# Patient Record
Sex: FEMALE | Race: WHITE | NOT HISPANIC OR LATINO
[De-identification: names, ages, dates, MRNs, and addresses within clinical notes are randomized per-mention and may not be internally consistent; named-entity substitution may affect disease eponyms.]

---

## 2017-03-09 ENCOUNTER — APPOINTMENT (OUTPATIENT)
Dept: OBGYN | Facility: CLINIC | Age: 72
End: 2017-03-09

## 2017-03-09 VITALS
DIASTOLIC BLOOD PRESSURE: 70 MMHG | RESPIRATION RATE: 16 BRPM | WEIGHT: 126 LBS | HEART RATE: 75 BPM | BODY MASS INDEX: 22.32 KG/M2 | SYSTOLIC BLOOD PRESSURE: 130 MMHG | OXYGEN SATURATION: 98 % | HEIGHT: 63 IN

## 2017-03-09 DIAGNOSIS — Z13.820 ENCOUNTER FOR SCREENING FOR OSTEOPOROSIS: ICD-10-CM

## 2017-03-09 DIAGNOSIS — Z12.31 ENCOUNTER FOR SCREENING MAMMOGRAM FOR MALIGNANT NEOPLASM OF BREAST: ICD-10-CM

## 2017-03-09 DIAGNOSIS — Z80.49 FAMILY HISTORY OF MALIGNANT NEOPLASM OF OTHER GENITAL ORGANS: ICD-10-CM

## 2017-03-13 LAB — HPV HIGH+LOW RISK DNA PNL CVX: NEGATIVE

## 2017-03-15 LAB — CYTOLOGY CVX/VAG DOC THIN PREP: NORMAL

## 2017-11-14 ENCOUNTER — APPOINTMENT (OUTPATIENT)
Dept: MAMMOGRAPHY | Facility: HOSPITAL | Age: 72
End: 2017-11-14
Payer: MEDICARE

## 2017-11-14 ENCOUNTER — OUTPATIENT (OUTPATIENT)
Dept: OUTPATIENT SERVICES | Facility: HOSPITAL | Age: 72
LOS: 1 days | End: 2017-11-14
Payer: COMMERCIAL

## 2017-11-14 DIAGNOSIS — Z00.8 ENCOUNTER FOR OTHER GENERAL EXAMINATION: ICD-10-CM

## 2017-11-14 DIAGNOSIS — Z12.31 ENCOUNTER FOR SCREENING MAMMOGRAM FOR MALIGNANT NEOPLASM OF BREAST: ICD-10-CM

## 2017-11-14 PROCEDURE — 77067 SCR MAMMO BI INCL CAD: CPT

## 2017-11-14 PROCEDURE — G0202: CPT | Mod: 26

## 2017-11-14 PROCEDURE — 77063 BREAST TOMOSYNTHESIS BI: CPT

## 2017-11-14 PROCEDURE — 77063 BREAST TOMOSYNTHESIS BI: CPT | Mod: 26

## 2018-02-02 ENCOUNTER — APPOINTMENT (OUTPATIENT)
Dept: CARDIOLOGY | Facility: CLINIC | Age: 73
End: 2018-02-02

## 2018-04-26 ENCOUNTER — OUTPATIENT (OUTPATIENT)
Dept: OUTPATIENT SERVICES | Facility: HOSPITAL | Age: 73
LOS: 1 days | End: 2018-04-26
Payer: COMMERCIAL

## 2018-04-26 ENCOUNTER — APPOINTMENT (OUTPATIENT)
Dept: RADIOLOGY | Facility: HOSPITAL | Age: 73
End: 2018-04-26
Payer: MEDICARE

## 2018-04-26 DIAGNOSIS — Z00.8 ENCOUNTER FOR OTHER GENERAL EXAMINATION: ICD-10-CM

## 2018-04-26 DIAGNOSIS — M81.0 AGE-RELATED OSTEOPOROSIS WITHOUT CURRENT PATHOLOGICAL FRACTURE: ICD-10-CM

## 2018-04-26 DIAGNOSIS — Z13.820 ENCOUNTER FOR SCREENING FOR OSTEOPOROSIS: ICD-10-CM

## 2018-04-26 DIAGNOSIS — M85.89 OTHER SPECIFIED DISORDERS OF BONE DENSITY AND STRUCTURE, MULTIPLE SITES: ICD-10-CM

## 2018-04-26 PROCEDURE — 77080 DXA BONE DENSITY AXIAL: CPT | Mod: 26

## 2018-04-26 PROCEDURE — 77080 DXA BONE DENSITY AXIAL: CPT

## 2018-06-04 ENCOUNTER — APPOINTMENT (OUTPATIENT)
Dept: ORTHOPEDIC SURGERY | Facility: CLINIC | Age: 73
End: 2018-06-04
Payer: MEDICARE

## 2018-06-04 VITALS
DIASTOLIC BLOOD PRESSURE: 70 MMHG | SYSTOLIC BLOOD PRESSURE: 154 MMHG | HEIGHT: 63 IN | HEART RATE: 75 BPM | BODY MASS INDEX: 22.32 KG/M2 | WEIGHT: 126 LBS

## 2018-06-04 DIAGNOSIS — M54.5 LOW BACK PAIN: ICD-10-CM

## 2018-06-04 PROCEDURE — 99204 OFFICE O/P NEW MOD 45 MIN: CPT

## 2018-07-06 ENCOUNTER — APPOINTMENT (OUTPATIENT)
Dept: MRI IMAGING | Facility: HOSPITAL | Age: 73
End: 2018-07-06
Payer: MEDICARE

## 2018-07-06 ENCOUNTER — OUTPATIENT (OUTPATIENT)
Dept: OUTPATIENT SERVICES | Facility: HOSPITAL | Age: 73
LOS: 1 days | End: 2018-07-06
Payer: COMMERCIAL

## 2018-07-06 DIAGNOSIS — Z00.8 ENCOUNTER FOR OTHER GENERAL EXAMINATION: ICD-10-CM

## 2018-07-06 PROCEDURE — 72148 MRI LUMBAR SPINE W/O DYE: CPT

## 2018-07-06 PROCEDURE — 72148 MRI LUMBAR SPINE W/O DYE: CPT | Mod: 26

## 2018-07-26 PROBLEM — Z12.31 BREAST CANCER SCREENING: Status: ACTIVE | Noted: 2017-03-09

## 2018-12-14 ENCOUNTER — APPOINTMENT (OUTPATIENT)
Dept: MAMMOGRAPHY | Facility: HOSPITAL | Age: 73
End: 2018-12-14
Payer: MEDICARE

## 2018-12-14 ENCOUNTER — OUTPATIENT (OUTPATIENT)
Dept: OUTPATIENT SERVICES | Facility: HOSPITAL | Age: 73
LOS: 1 days | End: 2018-12-14
Payer: COMMERCIAL

## 2018-12-14 DIAGNOSIS — Z00.8 ENCOUNTER FOR OTHER GENERAL EXAMINATION: ICD-10-CM

## 2018-12-14 PROCEDURE — 77067 SCR MAMMO BI INCL CAD: CPT | Mod: 26

## 2018-12-14 PROCEDURE — 77067 SCR MAMMO BI INCL CAD: CPT

## 2018-12-14 PROCEDURE — 77063 BREAST TOMOSYNTHESIS BI: CPT

## 2018-12-14 PROCEDURE — 77063 BREAST TOMOSYNTHESIS BI: CPT | Mod: 26

## 2019-02-20 ENCOUNTER — NON-APPOINTMENT (OUTPATIENT)
Age: 74
End: 2019-02-20

## 2019-02-20 ENCOUNTER — APPOINTMENT (OUTPATIENT)
Dept: CARDIOLOGY | Facility: CLINIC | Age: 74
End: 2019-02-20
Payer: MEDICARE

## 2019-02-20 VITALS
DIASTOLIC BLOOD PRESSURE: 60 MMHG | RESPIRATION RATE: 16 BRPM | SYSTOLIC BLOOD PRESSURE: 144 MMHG | HEIGHT: 63 IN | HEART RATE: 79 BPM | BODY MASS INDEX: 22.32 KG/M2 | WEIGHT: 126 LBS | OXYGEN SATURATION: 99 %

## 2019-02-20 DIAGNOSIS — R07.9 CHEST PAIN, UNSPECIFIED: ICD-10-CM

## 2019-02-20 PROCEDURE — 99205 OFFICE O/P NEW HI 60 MIN: CPT

## 2019-02-20 PROCEDURE — 93000 ELECTROCARDIOGRAM COMPLETE: CPT

## 2019-02-22 ENCOUNTER — APPOINTMENT (OUTPATIENT)
Dept: CARDIOLOGY | Facility: CLINIC | Age: 74
End: 2019-02-22
Payer: MEDICARE

## 2019-02-22 PROCEDURE — 93306 TTE W/DOPPLER COMPLETE: CPT

## 2019-03-22 ENCOUNTER — APPOINTMENT (OUTPATIENT)
Dept: CARDIOLOGY | Facility: CLINIC | Age: 74
End: 2019-03-22
Payer: MEDICARE

## 2019-03-22 PROCEDURE — 93015 CV STRESS TEST SUPVJ I&R: CPT

## 2019-06-06 ENCOUNTER — APPOINTMENT (OUTPATIENT)
Dept: OBGYN | Facility: CLINIC | Age: 74
End: 2019-06-06
Payer: MEDICARE

## 2019-06-06 VITALS
WEIGHT: 122 LBS | HEIGHT: 63 IN | HEART RATE: 82 BPM | BODY MASS INDEX: 21.62 KG/M2 | SYSTOLIC BLOOD PRESSURE: 115 MMHG | DIASTOLIC BLOOD PRESSURE: 58 MMHG

## 2019-06-06 PROCEDURE — G0101: CPT

## 2019-06-06 RX ORDER — FAMOTIDINE 40 MG/1
40 TABLET, FILM COATED ORAL
Refills: 0 | Status: ACTIVE | COMMUNITY

## 2019-06-06 NOTE — CHIEF COMPLAINT
[Annual Visit] : annual visit [FreeTextEntry1] : pap 2017,mammo 2019,colon 2017,dexa 2018\par 1 daughter colostomy--per colon--divertic abscess\par the other appendectomy\par py crohn stable

## 2019-06-06 NOTE — PHYSICAL EXAM
[Awake] : awake [Alert] : alert [Soft] : soft [Oriented x3] : oriented to person, place, and time [Normal] : vagina [No Bleeding] : there was no active vaginal bleeding [Enlarged ___ wks] : enlarged [unfilled] ~Uweeks [Uterine Adnexae] : were not tender and not enlarged [Mass] : no breast mass [Acute Distress] : no acute distress [Nipple Discharge] : no nipple discharge [Axillary LAD] : no axillary lymphadenopathy [Tender] : non tender

## 2019-06-07 LAB — HPV HIGH+LOW RISK DNA PNL CVX: NOT DETECTED

## 2019-06-11 LAB — CYTOLOGY CVX/VAG DOC THIN PREP: NORMAL

## 2019-10-14 ENCOUNTER — OUTPATIENT (OUTPATIENT)
Dept: OUTPATIENT SERVICES | Facility: HOSPITAL | Age: 74
LOS: 1 days | End: 2019-10-14
Payer: COMMERCIAL

## 2019-10-14 ENCOUNTER — APPOINTMENT (OUTPATIENT)
Dept: CT IMAGING | Facility: HOSPITAL | Age: 74
End: 2019-10-14
Payer: MEDICARE

## 2019-10-14 DIAGNOSIS — Z00.8 ENCOUNTER FOR OTHER GENERAL EXAMINATION: ICD-10-CM

## 2019-10-14 PROCEDURE — 70450 CT HEAD/BRAIN W/O DYE: CPT | Mod: 26

## 2019-10-14 PROCEDURE — 70450 CT HEAD/BRAIN W/O DYE: CPT

## 2019-10-18 ENCOUNTER — APPOINTMENT (OUTPATIENT)
Dept: ULTRASOUND IMAGING | Facility: HOSPITAL | Age: 74
End: 2019-10-18
Payer: MEDICARE

## 2019-10-18 ENCOUNTER — OUTPATIENT (OUTPATIENT)
Dept: OUTPATIENT SERVICES | Facility: HOSPITAL | Age: 74
LOS: 1 days | End: 2019-10-18
Payer: COMMERCIAL

## 2019-10-18 DIAGNOSIS — Z00.8 ENCOUNTER FOR OTHER GENERAL EXAMINATION: ICD-10-CM

## 2019-10-18 PROCEDURE — 76700 US EXAM ABDOM COMPLETE: CPT | Mod: 26

## 2019-10-18 PROCEDURE — 76700 US EXAM ABDOM COMPLETE: CPT

## 2019-10-22 ENCOUNTER — OUTPATIENT (OUTPATIENT)
Dept: OUTPATIENT SERVICES | Facility: HOSPITAL | Age: 74
LOS: 1 days | End: 2019-10-22
Payer: COMMERCIAL

## 2019-10-22 ENCOUNTER — APPOINTMENT (OUTPATIENT)
Dept: ULTRASOUND IMAGING | Facility: HOSPITAL | Age: 74
End: 2019-10-22
Payer: MEDICARE

## 2019-10-22 DIAGNOSIS — Z00.8 ENCOUNTER FOR OTHER GENERAL EXAMINATION: ICD-10-CM

## 2019-10-22 PROCEDURE — 76856 US EXAM PELVIC COMPLETE: CPT

## 2019-10-22 PROCEDURE — 76856 US EXAM PELVIC COMPLETE: CPT | Mod: 26

## 2019-10-22 PROCEDURE — 76830 TRANSVAGINAL US NON-OB: CPT | Mod: 26

## 2019-10-22 PROCEDURE — 76830 TRANSVAGINAL US NON-OB: CPT

## 2020-11-17 ENCOUNTER — OUTPATIENT (OUTPATIENT)
Dept: OUTPATIENT SERVICES | Facility: HOSPITAL | Age: 75
LOS: 1 days | End: 2020-11-17
Payer: COMMERCIAL

## 2020-11-17 ENCOUNTER — APPOINTMENT (OUTPATIENT)
Dept: RADIOLOGY | Facility: HOSPITAL | Age: 75
End: 2020-11-17
Payer: MEDICARE

## 2020-11-17 DIAGNOSIS — Z00.8 ENCOUNTER FOR OTHER GENERAL EXAMINATION: ICD-10-CM

## 2020-11-17 PROCEDURE — 77080 DXA BONE DENSITY AXIAL: CPT | Mod: 26

## 2020-11-17 PROCEDURE — 77080 DXA BONE DENSITY AXIAL: CPT

## 2020-12-01 ENCOUNTER — OUTPATIENT (OUTPATIENT)
Dept: OUTPATIENT SERVICES | Facility: HOSPITAL | Age: 75
LOS: 1 days | End: 2020-12-01
Payer: COMMERCIAL

## 2020-12-01 ENCOUNTER — APPOINTMENT (OUTPATIENT)
Dept: ULTRASOUND IMAGING | Facility: HOSPITAL | Age: 75
End: 2020-12-01
Payer: MEDICARE

## 2020-12-01 DIAGNOSIS — Z00.8 ENCOUNTER FOR OTHER GENERAL EXAMINATION: ICD-10-CM

## 2020-12-01 PROCEDURE — 76536 US EXAM OF HEAD AND NECK: CPT | Mod: 26

## 2020-12-01 PROCEDURE — 76536 US EXAM OF HEAD AND NECK: CPT

## 2021-02-11 ENCOUNTER — APPOINTMENT (OUTPATIENT)
Dept: ULTRASOUND IMAGING | Facility: HOSPITAL | Age: 76
End: 2021-02-11

## 2021-04-27 ENCOUNTER — APPOINTMENT (OUTPATIENT)
Dept: MAMMOGRAPHY | Facility: HOSPITAL | Age: 76
End: 2021-04-27
Payer: MEDICARE

## 2021-04-27 ENCOUNTER — RESULT REVIEW (OUTPATIENT)
Age: 76
End: 2021-04-27

## 2021-04-27 ENCOUNTER — NON-APPOINTMENT (OUTPATIENT)
Age: 76
End: 2021-04-27

## 2021-04-27 ENCOUNTER — OUTPATIENT (OUTPATIENT)
Dept: OUTPATIENT SERVICES | Facility: HOSPITAL | Age: 76
LOS: 1 days | End: 2021-04-27
Payer: COMMERCIAL

## 2021-04-27 DIAGNOSIS — R92.2 INCONCLUSIVE MAMMOGRAM: ICD-10-CM

## 2021-04-27 DIAGNOSIS — Z12.39 ENCOUNTER FOR OTHER SCREENING FOR MALIGNANT NEOPLASM OF BREAST: ICD-10-CM

## 2021-04-27 PROCEDURE — 77067 SCR MAMMO BI INCL CAD: CPT

## 2021-04-27 PROCEDURE — 77067 SCR MAMMO BI INCL CAD: CPT | Mod: 26

## 2021-04-27 PROCEDURE — 77063 BREAST TOMOSYNTHESIS BI: CPT

## 2021-04-27 PROCEDURE — 77063 BREAST TOMOSYNTHESIS BI: CPT | Mod: 26

## 2021-04-29 ENCOUNTER — NON-APPOINTMENT (OUTPATIENT)
Age: 76
End: 2021-04-29

## 2021-05-04 ENCOUNTER — OUTPATIENT (OUTPATIENT)
Dept: OUTPATIENT SERVICES | Facility: HOSPITAL | Age: 76
LOS: 1 days | End: 2021-05-04
Payer: COMMERCIAL

## 2021-05-04 ENCOUNTER — APPOINTMENT (OUTPATIENT)
Dept: ULTRASOUND IMAGING | Facility: HOSPITAL | Age: 76
End: 2021-05-04
Payer: MEDICARE

## 2021-05-04 ENCOUNTER — RESULT REVIEW (OUTPATIENT)
Age: 76
End: 2021-05-04

## 2021-05-04 DIAGNOSIS — R92.2 INCONCLUSIVE MAMMOGRAM: ICD-10-CM

## 2021-05-04 PROCEDURE — 76641 ULTRASOUND BREAST COMPLETE: CPT

## 2021-05-04 PROCEDURE — 76641 ULTRASOUND BREAST COMPLETE: CPT | Mod: 26,50

## 2021-05-05 ENCOUNTER — NON-APPOINTMENT (OUTPATIENT)
Age: 76
End: 2021-05-05

## 2021-05-05 DIAGNOSIS — Q83.9 CONGENITAL MALFORMATION OF BREAST, UNSPECIFIED: ICD-10-CM

## 2021-05-08 ENCOUNTER — RESULT REVIEW (OUTPATIENT)
Age: 76
End: 2021-05-08

## 2021-05-08 ENCOUNTER — OUTPATIENT (OUTPATIENT)
Dept: OUTPATIENT SERVICES | Facility: HOSPITAL | Age: 76
LOS: 1 days | End: 2021-05-08
Payer: COMMERCIAL

## 2021-05-08 ENCOUNTER — APPOINTMENT (OUTPATIENT)
Dept: ULTRASOUND IMAGING | Facility: IMAGING CENTER | Age: 76
End: 2021-05-08
Payer: MEDICARE

## 2021-05-08 DIAGNOSIS — Q83.9 CONGENITAL MALFORMATION OF BREAST, UNSPECIFIED: ICD-10-CM

## 2021-05-08 PROCEDURE — 19083 BX BREAST 1ST LESION US IMAG: CPT | Mod: RT

## 2021-05-08 PROCEDURE — 77065 DX MAMMO INCL CAD UNI: CPT | Mod: 26,RT

## 2021-05-08 PROCEDURE — 19083 BX BREAST 1ST LESION US IMAG: CPT

## 2021-05-08 PROCEDURE — A4648: CPT

## 2021-05-08 PROCEDURE — 77065 DX MAMMO INCL CAD UNI: CPT

## 2021-05-12 ENCOUNTER — NON-APPOINTMENT (OUTPATIENT)
Age: 76
End: 2021-05-12

## 2021-09-21 ENCOUNTER — APPOINTMENT (OUTPATIENT)
Dept: OBGYN | Facility: CLINIC | Age: 76
End: 2021-09-21
Payer: MEDICARE

## 2021-09-21 VITALS
WEIGHT: 125 LBS | HEIGHT: 63 IN | BODY MASS INDEX: 22.15 KG/M2 | SYSTOLIC BLOOD PRESSURE: 125 MMHG | DIASTOLIC BLOOD PRESSURE: 77 MMHG | HEART RATE: 76 BPM

## 2021-09-21 PROCEDURE — G0101: CPT

## 2021-09-21 PROCEDURE — 99397 PER PM REEVAL EST PAT 65+ YR: CPT

## 2021-09-21 NOTE — HISTORY OF PRESENT ILLNESS
[Good] : being in good health [Postmenopausal] : is postmenopausal [HPV Vaccine NA Due to Age] : HPV vaccine not available to patient due to age [Mammogramdate] : see emr [PapSmeardate] : 2019

## 2021-09-22 LAB — HPV HIGH+LOW RISK DNA PNL CVX: NOT DETECTED

## 2021-09-27 LAB — CYTOLOGY CVX/VAG DOC THIN PREP: ABNORMAL

## 2021-10-30 ENCOUNTER — TRANSCRIPTION ENCOUNTER (OUTPATIENT)
Age: 76
End: 2021-10-30

## 2021-10-31 ENCOUNTER — EMERGENCY (EMERGENCY)
Facility: HOSPITAL | Age: 76
LOS: 0 days | Discharge: HOME | End: 2021-10-31
Payer: MEDICARE

## 2021-10-31 VITALS
RESPIRATION RATE: 18 BRPM | OXYGEN SATURATION: 96 % | DIASTOLIC BLOOD PRESSURE: 53 MMHG | HEART RATE: 66 BPM | TEMPERATURE: 99 F | SYSTOLIC BLOOD PRESSURE: 119 MMHG

## 2021-10-31 VITALS — HEIGHT: 62 IN

## 2021-10-31 DIAGNOSIS — U07.1 COVID-19: ICD-10-CM

## 2021-10-31 PROCEDURE — L9998: CPT

## 2021-10-31 PROCEDURE — M0243: CPT

## 2021-10-31 RX ORDER — SODIUM CHLORIDE 9 MG/ML
250 INJECTION INTRAMUSCULAR; INTRAVENOUS; SUBCUTANEOUS
Refills: 0 | Status: COMPLETED | OUTPATIENT
Start: 2021-10-31 | End: 2021-10-31

## 2021-10-31 RX ADMIN — SODIUM CHLORIDE 310 MILLILITER(S): 9 INJECTION INTRAMUSCULAR; INTRAVENOUS; SUBCUTANEOUS at 09:11

## 2021-10-31 RX ADMIN — SODIUM CHLORIDE 250 MILLILITER(S): 9 INJECTION INTRAMUSCULAR; INTRAVENOUS; SUBCUTANEOUS at 10:36

## 2021-10-31 NOTE — ED STATDOCS - CLINICAL SUMMARY MEDICAL DECISION MAKING FREE TEXT BOX
Patient has history of COVID-19 symptoms for [ 3 ] days    Patient has high risk factors that include:  [  ] Chronic Kidney Disease [  ] Diabetes Mellitus [  ] Immune Suppressive [  ] Receiving Immunosuppressive Treatment [  ] Overweight/Obesity BMI greater than 25 [X  ] Age greater than or equal 65 years [  ] Cardiovascular Disease [X  ] HTN [  ] COPD/Other Chronic Respiratory Disease [  ] Sickle Cell Disease [  ] Congenital/Acquired Heart Disease [  ] Neurodevelopmental Disorder [  ] Medical related technology dependence [  ] Asthma/Chronic Respiratory Disease [  ] Immune Suppressive Disease [  ] Receiving Immune Suppressive Therapy [  ] Other:     Patient received prior COVID treatment that included [  ]  Patient provided explanation of monoclonal antibody infusion and is in agreement with treatment plan. See consent form in medical record.    [  ] Patient received monoclonal antibody infusion with no adverse reaction. Patient planned for discharge home and follow up with outpatient [facility] program.    [  ] Patient received monoclonal antibody infusion and experienced an adverse reaction. Symptoms include [  ]. Patient treated with [  ]. Patient planned for [  ] Patient has history of COVID-19 symptoms for [ 3 ] days    Patient has high risk factors that include:  [  ] Chronic Kidney Disease [  ] Diabetes Mellitus [  ] Immune Suppressive [  ] Receiving Immunosuppressive Treatment [  ] Overweight/Obesity BMI greater than 25 [X  ] Age greater than or equal 65 years [  ] Cardiovascular Disease [X  ] HTN [  ] COPD/Other Chronic Respiratory Disease [  ] Sickle Cell Disease [  ] Congenital/Acquired Heart Disease [  ] Neurodevelopmental Disorder [  ] Medical related technology dependence [  ] Asthma/Chronic Respiratory Disease [  ] Immune Suppressive Disease [  ] Receiving Immune Suppressive Therapy [  ] Other:     Patient received prior COVID treatment that included [  ]  Patient provided explanation of monoclonal antibody infusion and is in agreement with treatment plan. See consent form in medical record.    [X  ] Patient received monoclonal antibody infusion with no adverse reaction. Patient planned for discharge home and follow up with outpatient [facility] program.    [  ] Patient received monoclonal antibody infusion and experienced an adverse reaction. Symptoms include [  ]. Patient treated with [  ]. Patient planned for [  ]

## 2021-10-31 NOTE — ED STATDOCS - NSFOLLOWUPINSTRUCTIONS_ED_ALL_ED_FT
COVID-19 (Coronavirus Disease 2019)    WHAT YOU NEED TO KNOW:    Coronavirus disease 2019 (COVID-19) is the disease caused by a coronavirus first discovered in 2019. Coronaviruses generally cause upper respiratory (nose, throat, and lung) infections, such as a cold. The new virus spreads quickly and easily. The virus can be spread starting 2 days before symptoms even begin. The virus has also changed into several new forms (called variants) since it was discovered. The variants may be more contagious (easily spread) than the original form. Some may also cause more severe illness than others. It is important to follow local, national, and worldwide measures to protect yourself and others from infection.    DISCHARGE INSTRUCTIONS:    If you think you or someone you know may be infected: Do the following to protect others:   •If emergency care is needed, tell the  about the possible infection, or call ahead and tell the emergency department.      •Call a healthcare provider for instructions if symptoms are mild. Anyone who may be infected should not arrive without calling first. The provider will need to protect staff members and other patients.      •The person who may be infected needs to wear a face covering while getting medical care. This will help lower the risk of infecting others. Coverings are not used for anyone who is younger than 2 years, has breathing problems, or cannot remove it. The provider can give you instructions for anyone who cannot wear a covering.      Call your local emergency number (911 in the [place]) or an emergency department if:   •You have trouble breathing or shortness of breath at rest.      •You have chest pain or pressure that lasts longer than 5 minutes.      •You become confused or hard to wake.      •Your lips or face are blue.      •You have a fever of 104°F (40°C) or higher.      Call your doctor if:   •You do not have symptoms of COVID-19 but had close physical contact within 14 days with someone who tested positive.      •You have questions or concerns about your condition or care.      Medicines: You may need any of the following for mild symptoms:   •Decongestants help reduce nasal congestion and help you breathe more easily. If you take decongestant pills, they may make you feel restless or cause problems with your sleep. Do not use decongestant sprays for more than a few days.      •Cough suppressants help reduce coughing. Ask your healthcare provider which type of cough medicine is best for you.      •Acetaminophen decreases pain and fever. It is available without a doctor's order. Ask how much to take and how often to take it. Follow directions. Read the labels of all other medicines you are using to see if they also contain acetaminophen, or ask your doctor or pharmacist. Acetaminophen can cause liver damage if not taken correctly. Do not use more than 4 grams (4,000 milligrams) total of acetaminophen in one day.       •NSAIDs, such as ibuprofen, help decrease swelling, pain, and fever. NSAIDs can cause stomach bleeding or kidney problems in certain people. If you take blood thinner medicine, always ask your healthcare provider if NSAIDs are safe for you. Always read the medicine label and follow directions.      •Take your medicine as directed. Contact your healthcare provider if you think your medicine is not helping or if you have side effects. Tell him or her if you are allergic to any medicine. Keep a list of the medicines, vitamins, and herbs you take. Include the amounts, and when and why you take them. Bring the list or the pill bottles to follow-up visits. Carry your medicine list with you in case of an emergency.      What you need to know about COVID-19 vaccines: Get a vaccine even if you already had COVID-19.  •COVID-19 vaccines are given as a shot in 1 or 2 doses. Some vaccines have emergency use authorization (EUA). An EUA means the vaccine is not approved but is given because the benefits outweigh the risks. A 2-dose vaccine is fully approved for use in those 16 years or older. This vaccine also has an EUA for adolescents 12 to 15 years. Your healthcare provider can help you understand the benefits and risks.      •A third dose is recommended for adults with a weakened immune system who get a 2-dose vaccine. The third dose is given at least 28 days after the second.      •Even after you get the vaccine, continue social distancing and other measures. Experts are still learning how well the vaccines work to prevent infection, transmission, and severe illness. Although rare, you can become infected after you get the vaccine. You may also be able to pass the virus to others without knowing you are infected.      •After you get the vaccine, check local, national, and international travel rules. Check to see if you need to be tested before you travel. You may also need to quarantine after you return. Some countries require proof of a negative test before you leave. You should also be tested 3 to 5 days after you return from another country.      How the 2019 coronavirus spreads: The following are ways the virus is thought to spread, but more information may be coming:   •Droplets are the main way all coronaviruses spread. The virus travels in droplets that form when a person talks, coughs, or sneezes. The droplets can also float in the air for minutes or hours. Infection happens when you breathe in the droplets or get them in your eyes or nose. Close personal contact with an infected person increases your risk for infection. This means being within 6 feet (2 meters) of the person for at least 15 minutes over 24 hours.      •Person-to-person contact can spread the virus. For example, a person with the virus on his or her hands can spread it by shaking hands with someone.      •The virus can stay on objects and surfaces for a short time. You may become infected by touching the object or surface and then touching your eyes or mouth.      •An infected animal may be able to infect a person who touches it. This may happen at live markets or on a farm.      Help lower the risk for COVID-19: The best way to prevent infection is to avoid anyone who is infected, but this can be hard to do. An infected person can spread the virus before signs or symptoms begin, or even if signs or symptoms never develop. The following can help lower the risk for infection:     Prevent COVID-19 Infection     •Wash your hands often throughout the day. Use soap and water. Rub your soapy hands together, lacing your fingers, for at least 20 seconds. Rinse with warm, running water. Dry your hands with a clean towel or paper towel. Use hand  that contains alcohol if soap and water are not available. Teach children how to wash their hands and use hand .  Handwashing           •Cover sneezes and coughs. Turn your face away and cover your mouth and nose with a tissue. Throw the tissue away. Use the bend of your arm if a tissue is not available. Then wash your hands well with soap and water or use hand . Teach children how to cover a cough or sneeze.      •Wear a face covering (mask) around anyone who does not live in your home. Use a cloth covering with at least 2 layers. You can also create layers by putting a cloth covering over a disposable non-medical mask. Cover your mouth and your nose. The covering should fit snugly against the bridge of your nose. Securely fasten it under your chin and on the sides of your face. Do not wear a plastic face shield instead of a covering. Continue social distancing and washing your hands often. A face covering is not a substitute for social distancing safety measures.  How to Wear a Face Covering (Mask)           •Follow worldwide, national, and local social distancing guidelines. Keep at least 6 feet (2 meters) between you and others. Also keep this distance from anyone who comes to your home, such as someone making a delivery. Wear a face covering while you are around others. You will need to wear a covering in restaurants, stores, and other public buildings. You will also need a covering on mass transit, such as a bus, subway, or airplane. Remember to use a covering made from thick material or wear 2 coverings together.      •Make a habit of not touching your face. If you get the virus on your hands, you can transfer it to your eyes, nose, or mouth and become infected. You can also transfer it to objects, surfaces, or people. Do not touch your eyes, nose, or mouth without washing your hands first.      •Clean and disinfect high-touch surfaces and objects often. Use disinfecting wipes, or make a solution of 4 teaspoons of bleach in 1 quart (4 cups) of water. Clean and disinfect even if you think no one living in or coming to your home is infected with the virus.      •Ask about other vaccines you may need. Get the influenza (flu) vaccine as soon as recommended each year, usually starting in September or October. Get the pneumonia vaccine if recommended. Your healthcare provider can tell you if you should also get other vaccines, and when to get them.      Follow social distancing guidelines: National and local social distancing rules vary. Rules may change over time as restrictions are lifted. Restrictions may return if an outbreak happens where you live. It is important to know and follow all current social distancing rules in your area. The following are general guidelines:  •Stay home if you are sick or think you may have COVID-19. It is important to stay home if you are waiting for a testing appointment or for test results. Even if you do not have symptoms, you can pass the virus to others.      •Limit trips out of your home. Have food, medicines, and other supplies delivered and left at your door or other area, if possible. Plan trips out of your home so you make the fewest stops possible to limit close personal contact. Keep track of places you go. This will help contact tracers notify others if you become infected.      •Avoid close physical contact with anyone who does not live in your home. Do not shake hands with, hug, or kiss a person as a greeting. If you must use public transportation (such as a bus or subway), try to sit or stand away from others. Only allow necessary people into your home. Wear your face covering, and remind others to wear a face covering. Remind them to wash their hands when they arrive and before they leave. Do not let someone into your home or go to someone's home just to visit. Even if you both do not feel sick, the virus can pass from one of you to the other.      •Avoid in-person gatherings and crowds. Gatherings or crowds of 10 or more individuals can cause the virus to spread. Avoid places such as [place], beaches, sporting events, and tourist attractions. For events such as parties, holiday meals, [employer] services, and conferences, attend virtually (on a computer), if possible.      •Ask your healthcare provider for other ways to have appointments. Some providers offer phone, video, or other types of appointments. You may also be able to get prescriptions for a few months of your medicines at a time.      •Stay safe if you must go out to work. Keep physical distance between you and other workers as much as possible. Follow your employer's rules so everyone stays safe.      If you have COVID-19 and are recovering at home, healthcare providers will give you specific instructions to follow. The following are general guidelines to remind you how to keep others safe until you are well:   •Wash your hands often. Use soap and water as much as possible. Use hand  that contains alcohol if soap and water are not available. Dry your hands with a clean towel or paper towel. Do not share towels with anyone. If you use paper towels, throw them away in a lined trash can kept in your room or area. Use a covered trash can, if possible.      •Do not go out of your home unless it is necessary. Ask someone who is not infected to go out for groceries or supplies, or have them delivered. Do not go to your healthcare provider's office without an appointment.      •Only have close physical contact with a person giving direct care, or a baby or child you must care for. Family members and friends should not visit you. If possible, stay in a separate area or room of your home if you live with others. No one should go into the area or room except to give you care. You can visit with others by phone, video chat, e-mail, or similar systems.      •Wear a face covering while others are near you. This can help prevent droplets from spreading the virus when you talk, sneeze, or cough. Put the covering on before anyone comes into your room or area. Remind the person to cover his or her nose and mouth before coming in to provide care for you.      •Do not share items. Do not share dishes, towels, or other items with anyone. Items need to be washed after you use them.      •Protect your baby. Some newborns have tested positive for the virus. It is not known if they became infected before or after birth. The highest risk is when a  has close contact with an infected person. If you are pregnant or breastfeeding, talk to your healthcare provider or obstetrician about any concerns you have. He or she will tell you when to bring your baby in for check-ups and vaccines. He or she will also tell you what to do if you think your baby was infected with the coronavirus. Wash your hands and put on a clean face covering before you breastfeed or care for your baby.      •Do not handle live animals unless it is necessary. Some animals, including pets, have been infected with the new coronavirus. Ask someone who is not infected to take care of your pet until you are well. If you must care for a pet, wear a face covering. Wash your hands before and after you give care. Talk to your healthcare provider about how to keep a service animal safe, if needed.      •Follow directions from your healthcare provider for being around others after you recover. It is not known if or for how long a recovered person can pass the virus to others. Your provider may give you instructions, such as continuing social distancing and wearing a face covering. He or she will tell you when it is okay to be around others again. This may be 10 to 20 days after symptoms started or you had a positive test. Most symptoms will also need to be gone. Your provider will give you more information.      Follow up with your doctor as directed: Write down your questions so you remember to ask them during your visits.    For more information:   •Centers for Disease Control and Prevention  [address] [place] [address]  Phone: 1-454.187.6665  Web Address: http://www.cdc.gov

## 2021-10-31 NOTE — ED STATDOCS - NS ED ROS FT
ROS: Constitutional- -fever, +chills.  Respiratory- +cough, -SOB  Cardiac- no chest pain, no palpitations, ENT- -rhinorrhea, no sore throat, no congestion.  Abdomen- No nausea, no vomiting, no diarrhea.  Urinary- no dysuria, no urgency, no frequency.  Skin- No rashes

## 2021-10-31 NOTE — ED STATDOCS - OBJECTIVE STATEMENT
76 yr. old female presents to ED for MAB infusion after testing positive for Covid yesterday at her doctor's office. Exposed to daughter who tested positive. Reports symptoms started on Friday 10/29/21. Had Pfizer COVID vaccine

## 2021-10-31 NOTE — ED STATDOCS - PATIENT PORTAL LINK FT
You can access the FollowMyHealth Patient Portal offered by [facility] by registering at the following website: [email or web address]. By joining [email or web address]’s FollowMyHealth portal, you will also be able to view your health information using other applications (apps) compatible with our system.

## 2021-11-01 ENCOUNTER — TRANSCRIPTION ENCOUNTER (OUTPATIENT)
Age: 76
End: 2021-11-01

## 2021-11-15 ENCOUNTER — OUTPATIENT (OUTPATIENT)
Dept: OUTPATIENT SERVICES | Facility: HOSPITAL | Age: 76
LOS: 1 days | End: 2021-11-15
Payer: COMMERCIAL

## 2021-11-15 ENCOUNTER — APPOINTMENT (OUTPATIENT)
Dept: RADIOLOGY | Facility: HOSPITAL | Age: 76
End: 2021-11-15
Payer: MEDICARE

## 2021-11-15 DIAGNOSIS — U09.9 POST COVID-19 CONDITION, UNSPECIFIED: ICD-10-CM

## 2021-11-15 PROCEDURE — 71046 X-RAY EXAM CHEST 2 VIEWS: CPT

## 2021-11-15 PROCEDURE — 71046 X-RAY EXAM CHEST 2 VIEWS: CPT | Mod: 26

## 2022-04-18 ENCOUNTER — APPOINTMENT (OUTPATIENT)
Dept: RADIOLOGY | Facility: HOSPITAL | Age: 77
End: 2022-04-18
Payer: MEDICARE

## 2022-04-18 ENCOUNTER — OUTPATIENT (OUTPATIENT)
Dept: OUTPATIENT SERVICES | Facility: HOSPITAL | Age: 77
LOS: 1 days | End: 2022-04-18
Payer: COMMERCIAL

## 2022-04-18 DIAGNOSIS — Z00.8 ENCOUNTER FOR OTHER GENERAL EXAMINATION: ICD-10-CM

## 2022-04-18 PROCEDURE — 73060 X-RAY EXAM OF HUMERUS: CPT | Mod: 26,LT

## 2022-04-18 PROCEDURE — 73060 X-RAY EXAM OF HUMERUS: CPT

## 2022-05-23 ENCOUNTER — RESULT REVIEW (OUTPATIENT)
Age: 77
End: 2022-05-23

## 2022-05-23 ENCOUNTER — APPOINTMENT (OUTPATIENT)
Dept: ULTRASOUND IMAGING | Facility: HOSPITAL | Age: 77
End: 2022-05-23
Payer: MEDICARE

## 2022-05-23 ENCOUNTER — OUTPATIENT (OUTPATIENT)
Dept: OUTPATIENT SERVICES | Facility: HOSPITAL | Age: 77
LOS: 1 days | End: 2022-05-23
Payer: COMMERCIAL

## 2022-05-23 ENCOUNTER — APPOINTMENT (OUTPATIENT)
Dept: MAMMOGRAPHY | Facility: HOSPITAL | Age: 77
End: 2022-05-23
Payer: MEDICARE

## 2022-05-23 DIAGNOSIS — Z00.8 ENCOUNTER FOR OTHER GENERAL EXAMINATION: ICD-10-CM

## 2022-05-23 PROCEDURE — 77067 SCR MAMMO BI INCL CAD: CPT

## 2022-05-23 PROCEDURE — 77063 BREAST TOMOSYNTHESIS BI: CPT | Mod: 26

## 2022-05-23 PROCEDURE — 76641 ULTRASOUND BREAST COMPLETE: CPT | Mod: 26,50

## 2022-05-23 PROCEDURE — 77067 SCR MAMMO BI INCL CAD: CPT | Mod: 26

## 2022-05-23 PROCEDURE — 77063 BREAST TOMOSYNTHESIS BI: CPT

## 2022-05-23 PROCEDURE — 76641 ULTRASOUND BREAST COMPLETE: CPT

## 2022-07-26 ENCOUNTER — APPOINTMENT (OUTPATIENT)
Dept: RADIOLOGY | Facility: HOSPITAL | Age: 77
End: 2022-07-26

## 2022-07-26 ENCOUNTER — OUTPATIENT (OUTPATIENT)
Dept: OUTPATIENT SERVICES | Facility: HOSPITAL | Age: 77
LOS: 1 days | End: 2022-07-26
Payer: COMMERCIAL

## 2022-07-26 DIAGNOSIS — Z00.8 ENCOUNTER FOR OTHER GENERAL EXAMINATION: ICD-10-CM

## 2022-07-26 PROCEDURE — 72040 X-RAY EXAM NECK SPINE 2-3 VW: CPT

## 2022-07-26 PROCEDURE — 72040 X-RAY EXAM NECK SPINE 2-3 VW: CPT | Mod: 26

## 2022-09-25 ENCOUNTER — NON-APPOINTMENT (OUTPATIENT)
Age: 77
End: 2022-09-25

## 2022-09-30 ENCOUNTER — APPOINTMENT (OUTPATIENT)
Dept: ORTHOPEDIC SURGERY | Facility: CLINIC | Age: 77
End: 2022-09-30

## 2022-09-30 ENCOUNTER — NON-APPOINTMENT (OUTPATIENT)
Age: 77
End: 2022-09-30

## 2022-09-30 VITALS
DIASTOLIC BLOOD PRESSURE: 73 MMHG | HEART RATE: 73 BPM | WEIGHT: 125 LBS | HEIGHT: 63 IN | BODY MASS INDEX: 22.15 KG/M2 | SYSTOLIC BLOOD PRESSURE: 150 MMHG

## 2022-09-30 DIAGNOSIS — M54.12 RADICULOPATHY, CERVICAL REGION: ICD-10-CM

## 2022-09-30 PROCEDURE — 99203 OFFICE O/P NEW LOW 30 MIN: CPT

## 2022-10-01 NOTE — PHYSICAL EXAM
[de-identified] : Constitutional:  77 year old female, alert and oriented, cooperative, in no acute distress.\par \par HEENT \par NC/AT.  Appearance: symmetric\par \par Neck/Back\par Straight without deformity or instability.  Good ROM.\par \par Chest/Respiratory \par Respiratory effort: no intercostal retractions or use of accessory muscles. Nonlabored Breathing\par \par Mental Status: \par Judgment, insight: intact\par Orientation: oriented to time, place, and person\par \par Neurological:\par Sensory and Motor are grossly intact throughout\par \par Spine Exam:\par No gross deformity\par No midline Tenderness to Palpation over the Cervical or Lumbar Spine\par No paraspinal muscle Tenderness to Palpation or Hypertonicity \par Negative Leung\par Negative Spurling\par \par Motor: \par                C5               C6               C7               C8               T1 \par R            5/5               5/5              5/5               5/5              5/5\par L            5/5               5/5              5/5               5/5              5/5\par \par Sensory:\par                C5          C6          C7          C8          T1        (0=absent, 1=impaired, 2=normal, NT=not testable)\par R              2            2             2            2            2\par L              2            2             2            2            2\par \par Bilateral Shoulders\par \par Inspection:\par     Skin intact, no rashes or lesions\par     Non-tender to palpation over AC Joint\par     Mild tenderness over the bilateral deltoids and upper arm. \par \par Range of Motion:\par 	Abduction - 90\par 	Forward flexion - 160\par                 IR: T12\par                 ER: 45\par \par Shoulder Testing\par      No pain with Empty Can/Job Test\par      No pain with impingement tests\par      No pain with IR/ER shoulder\par \par Neurologic Exam\par     Motor intact including 5/5 AIN/PIN/Median/Radial/Ulnar Nerve Distributions\par     Sensation Intact to Light Touch including Median/Radial/Ulnar nerve distributions\par \par Vascular Exam\par     Hand is warm and well perfused with 2+ Radial Pulse \par  [de-identified] : Cervical Spine XRay: 7/26/22\par IMPRESSION:\par No compression fractures or spondylolistheses.\par \par Narrowed C5-C6 and C6-C7 disc spaces with small anterior disc margin osteophytes. Preserved remaining intervertebral disc spaces.\par \par Prevertebral soft tissues and predental interval within normal limits.\par Atlantoaxial and posterior facet alignment maintained.\par Intact odontoid.\par No discrete lytic or blastic lesions.\par \par Clear visualized lung apices and midline normal caliber trachea.\par \par Bilateral Humerus XRays: 4/18/22\par IMPRESSION:\par No fractures or dislocations.\par \par Preserved bilateral shoulder and elbow joint spaces.\par \par Generalized osteopenia otherwise no discrete lytic or blastic lesions.\par \par Unremarkable soft tissues and bilateral partially imaged peripheral hemithoraces.\par

## 2022-10-01 NOTE — REVIEW OF SYSTEMS
[Joint Pain] : no joint pain [Joint Swelling] : no joint swelling [Fever] : no fever [Chills] : no chills [FreeTextEntry5] : No chest pain [FreeTextEntry6] : No shortness of breath [FreeTextEntry7] : History of Crohn's disease [FreeTextEntry8] : No kidney issues [de-identified] : No fevers or chills [de-identified] : No numbness or tingling

## 2022-10-01 NOTE — HISTORY OF PRESENT ILLNESS
[de-identified] : Ms. Swain is a 77-year-old female who presented to the office for evaluation of her bilateral upper extremity pain.  Patient has been experiencing bilateral upper extremity pain since March or April 2022.  Pain is located in the lateral proximal arm.  It is described as a soreness that does not radiate into the hands.  Pain is worse at night and when lying down.  Patient has undergone bilateral humerus and cervical spine x-rays, which showed narrowed C5-C6 and C6-C7 disc spaces.  She underwent EMG which showed possible nerve root compression at C5-6 in the neck.  Patient has not tried physical therapy or anti-inflammatories for the pain.  She reports no hand numbness, no clumsiness, no gait changes.  Patient does have some lumbar back pain.  No recent traumas or falls.  No fevers or chills.  No significant neck pain.

## 2022-10-01 NOTE — DISCUSSION/SUMMARY
[de-identified] : Ms. Swain is a 77-year-old female who presented to the office for evaluation of her bilateral upper extremity pain.  Patient has been experiencing bilateral upper extremity pain for the last several months.  Pain is worse at night and when lying down.  XRays have shown decreased disc space at C5-6 and C6-7.  EMG showed bilateral C5-6 nerve root irritation.  Discussed with patient that bilateral upper extremity pain is likely coming from her C5-6 nerve root irritation.  Discussed that further evaluation with a spine specialist would be beneficial.  Patient has seen Dr. Espinosa in the past.  However, patient does not want further evaluation.  She would like to continue conservative management.  Discussed conservative management of upper extremity pain, including physical therapy and anti-inflammatories.  Patient does not want formal physical therapy or anti-inflammatories.  She was given a neck pain booklet containing neck exercises and precautions.  Patient recommended to follow-up with a spine specialist but decided she will follow up with one as needed.  Patient understanding and in agreement with plan.  All questions answered.\par \par Plan:\par - Physical therapy for neck pain (patient will do home exercises\par - Follow-up with a spine specialist. Patient has seen Dr. Espinosa previously

## 2022-10-27 ENCOUNTER — NON-APPOINTMENT (OUTPATIENT)
Age: 77
End: 2022-10-27

## 2022-11-01 ENCOUNTER — NON-APPOINTMENT (OUTPATIENT)
Age: 77
End: 2022-11-01

## 2022-11-04 ENCOUNTER — APPOINTMENT (OUTPATIENT)
Dept: RADIOLOGY | Facility: HOSPITAL | Age: 77
End: 2022-11-04

## 2022-11-14 ENCOUNTER — APPOINTMENT (OUTPATIENT)
Dept: ORTHOPEDIC SURGERY | Facility: CLINIC | Age: 77
End: 2022-11-14

## 2022-11-14 VITALS
SYSTOLIC BLOOD PRESSURE: 121 MMHG | DIASTOLIC BLOOD PRESSURE: 64 MMHG | HEIGHT: 63 IN | WEIGHT: 125 LBS | HEART RATE: 80 BPM | BODY MASS INDEX: 22.15 KG/M2

## 2022-11-14 DIAGNOSIS — M54.12 RADICULOPATHY, CERVICAL REGION: ICD-10-CM

## 2022-11-14 DIAGNOSIS — M51.36 OTHER INTERVERTEBRAL DISC DEGENERATION, LUMBAR REGION: ICD-10-CM

## 2022-11-14 PROCEDURE — 99214 OFFICE O/P EST MOD 30 MIN: CPT

## 2022-11-14 PROCEDURE — 72100 X-RAY EXAM L-S SPINE 2/3 VWS: CPT

## 2022-11-14 NOTE — HISTORY OF PRESENT ILLNESS
[Stable] : stable [de-identified] : Pt is a 77 year old female who presents to the office today for evaluation of B/L arm pain and lower back pain since March.\par Denies any neck pain. \par Pain radiates down B/L arms to the forearms, most of her pain is in the upper arms. Pain radiates to the B/L buttocks. \par Denies numbness/tingling. \par Denies dropping items. \par Does not take medications for the pain. \par Has not participated with PT or Chiropractic care. \par Has osteoporosis on prolea. \par No fever, chills, sweats, nausea/vomiting. No bowel or bladder dysfunction, no recent weight loss or gain. No night pain. This history is in addition to the intake form that I personally reviewed.\par Here with her .\par She is not dropping objects and has no issues with fine motor skills but she has achiness in her shoulders at night.

## 2022-11-14 NOTE — ADDENDUM
[FreeTextEntry1] : This note was written by Hussain Kelley on 11/14/2022 acting as scribe for Dr. Shaw Espinosa M.D.\par \par I, Shaw Espinosa MD, have read and attest that all the information, medical decision making and discharge instructions within are true and accurate.

## 2022-11-14 NOTE — DISCUSSION/SUMMARY
[de-identified] : L5-S1 mild disc degenerative disease.\par Mild C5-7 degenerative disc disease.\par EMG positive C5-6 bilaterally.\par Discussed all options. \par Tried HEP.\par Cervical MRI referral.\par F/U after MRI.\par All options discussed including rest,medicine,home exercise, acupuncture, Chiropractic care, physical therapy, pain management and last resort surgery. All questions were answered, all alternatives discussed and the patient is in complete agreement with the plan. Follow up appointment as instructed. If any issues arise, the patient will call or come in sooner.\par Her  agrees with the plan.

## 2022-11-14 NOTE — PHYSICAL EXAM
[Normal] : Gait: normal [Leung's Sign] : negative Leung's sign [Pronator Drift] : negative pronator drift [SLR] : negative straight leg raise [de-identified] : 5 out of 5 motor strength,sensation is intact and symmetrical full range of motion flexion extension and rotation, no palpatory tenderness full range of motion of hips knees shoulders and elbows (all four extremities), no atrophy, negative straight leg raise, no pathological reflexes, no swelling, normal ambulation, no apparent distress skin intact, no palpable lymph nodes, no upper or lower extremity instability, alert and oriented x 3 and normal mood. Normal finger-to nose test.\par No upper motor neuron findings.\par  [de-identified] : XR AP Lat Lumbar 11/14/2022 -mild L5-S1 degeneration -reviewed with patient. \par \par I reviewed, interpreted and clinically correlated the following outside imaging studies, \par \par EMG positive C5-6 bilaterally.\par \par \par XR C SPINE AP LAT ONLY 2-3V     (PACS) \par \par PROCEDURE DATE: 07/26/2022\par \par \par \par INTERPRETATION: CLINICAL INDICATION: intermittent neck and upper extremity pain for approximately 3 months\par \par EXAM:\par AP lateral and open-mouth AP cervical spine from 7/26/2022 at 1229. Compared to prior study from 1/20/2009.\par \par IMPRESSION:\par No compression fractures or spondylolistheses.\par \par Narrowed C5-C6 and C6-C7 disc spaces with small anterior disc margin osteophytes. Preserved remaining intervertebral disc spaces.\par \par Prevertebral soft tissues and predental interval within normal limits.\par Atlantoaxial and posterior facet alignment maintained.\par Intact odontoid.\par No discrete lytic or blastic lesions.\par \par Clear visualized lung apices and midline normal caliber trachea.\par \par --- End of Report ---\par

## 2022-11-18 ENCOUNTER — OUTPATIENT (OUTPATIENT)
Dept: OUTPATIENT SERVICES | Facility: HOSPITAL | Age: 77
LOS: 1 days | End: 2022-11-18
Payer: COMMERCIAL

## 2022-11-18 ENCOUNTER — APPOINTMENT (OUTPATIENT)
Dept: RADIOLOGY | Facility: HOSPITAL | Age: 77
End: 2022-11-18

## 2022-11-18 DIAGNOSIS — Z00.8 ENCOUNTER FOR OTHER GENERAL EXAMINATION: ICD-10-CM

## 2022-11-18 PROCEDURE — 77080 DXA BONE DENSITY AXIAL: CPT | Mod: 26

## 2022-11-18 PROCEDURE — 77080 DXA BONE DENSITY AXIAL: CPT

## 2022-11-21 ENCOUNTER — APPOINTMENT (OUTPATIENT)
Dept: MRI IMAGING | Facility: HOSPITAL | Age: 77
End: 2022-11-21

## 2022-12-07 ENCOUNTER — APPOINTMENT (OUTPATIENT)
Dept: ORTHOPEDIC SURGERY | Facility: CLINIC | Age: 77
End: 2022-12-07

## 2022-12-07 VITALS
HEIGHT: 63 IN | BODY MASS INDEX: 21.97 KG/M2 | WEIGHT: 124 LBS | OXYGEN SATURATION: 98 % | TEMPERATURE: 97.8 F | DIASTOLIC BLOOD PRESSURE: 72 MMHG | SYSTOLIC BLOOD PRESSURE: 128 MMHG | HEART RATE: 75 BPM

## 2022-12-07 DIAGNOSIS — M47.812 SPONDYLOSIS W/OUT MYELOPATHY OR RADICULOPATHY, CERVICAL REGION: ICD-10-CM

## 2022-12-07 PROCEDURE — 99214 OFFICE O/P EST MOD 30 MIN: CPT

## 2023-05-25 ENCOUNTER — NON-APPOINTMENT (OUTPATIENT)
Age: 78
End: 2023-05-25

## 2023-07-12 ENCOUNTER — APPOINTMENT (OUTPATIENT)
Dept: CARDIOLOGY | Facility: CLINIC | Age: 78
End: 2023-07-12
Payer: MEDICARE

## 2023-07-12 ENCOUNTER — NON-APPOINTMENT (OUTPATIENT)
Age: 78
End: 2023-07-12

## 2023-07-12 VITALS
BODY MASS INDEX: 25.52 KG/M2 | OXYGEN SATURATION: 96 % | WEIGHT: 144 LBS | TEMPERATURE: 94.7 F | SYSTOLIC BLOOD PRESSURE: 153 MMHG | DIASTOLIC BLOOD PRESSURE: 67 MMHG | HEART RATE: 76 BPM | HEIGHT: 63 IN | RESPIRATION RATE: 19 BRPM

## 2023-07-12 DIAGNOSIS — I25.10 ATHEROSCLEROTIC HEART DISEASE OF NATIVE CORONARY ARTERY W/OUT ANGINA PECTORIS: ICD-10-CM

## 2023-07-12 DIAGNOSIS — Z01.810 ENCOUNTER FOR PREPROCEDURAL CARDIOVASCULAR EXAMINATION: ICD-10-CM

## 2023-07-12 PROCEDURE — 93000 ELECTROCARDIOGRAM COMPLETE: CPT | Mod: NC

## 2023-07-12 PROCEDURE — 99214 OFFICE O/P EST MOD 30 MIN: CPT

## 2023-07-13 ENCOUNTER — NON-APPOINTMENT (OUTPATIENT)
Age: 78
End: 2023-07-13

## 2023-07-13 PROBLEM — I25.10 ATHEROSCLEROSIS OF CORONARY ARTERY: Status: ACTIVE | Noted: 2023-07-12

## 2023-07-13 PROBLEM — Z01.810 PRE-OPERATIVE CARDIOVASCULAR EXAMINATION: Status: ACTIVE | Noted: 2023-07-12

## 2023-07-13 RX ORDER — ROSUVASTATIN CALCIUM 5 MG/1
5 TABLET, FILM COATED ORAL
Refills: 0 | Status: ACTIVE | COMMUNITY
Start: 2023-07-13

## 2023-07-13 RX ORDER — ENALAPRIL MALEATE 20 MG/1
20 TABLET ORAL
Refills: 0 | Status: ACTIVE | COMMUNITY
Start: 2023-07-13

## 2023-07-13 RX ORDER — AMLODIPINE BESYLATE 10 MG/1
10 TABLET ORAL
Refills: 0 | Status: ACTIVE | COMMUNITY
Start: 2023-07-13

## 2023-07-13 NOTE — HISTORY OF PRESENT ILLNESS
[Preoperative Visit] : for a medical evaluation prior to surgery [Scheduled Procedure ___] : a [unfilled] [Date of Surgery ___] : on [unfilled] [Surgeon Name ___] : surgeon: [unfilled] [de-identified] : [facility] [FreeTextEntry1] : TC COWART comes today for evaluation. She  was advised to undergo a complete cardiac evaluation. She denies chest pains shortness of breath or loss of consciousnes. She has a partial mucosal prolapse.

## 2023-07-13 NOTE — HISTORY OF PRESENT ILLNESS
[Preoperative Visit] : for a medical evaluation prior to surgery [Scheduled Procedure ___] : a [unfilled] [Date of Surgery ___] : on [unfilled] [Surgeon Name ___] : surgeon: [unfilled] [de-identified] : [facility] [FreeTextEntry1] : TC COWART comes today for evaluation. She  was advised to undergo a complete cardiac evaluation. She denies chest pains shortness of breath or loss of consciousnes. She has a partial mucosal prolapse.

## 2023-07-13 NOTE — ASSESSMENT
[FreeTextEntry1] : Without evidence or recent infarction overt heart failure or unstable angina and based upon a satisfactory stress test  Ms Swain may undergo planned proctoplasty which constitutes low risk surgery in a patient with intermediate cardiac risk at an ASA class II  anesthesia risk.\par \par Medical necessity\par This is a high encounter based upon two or more chronic illnesses with mild exacerbation requiring further management and evaluation.   .\par \par EKG is indicated for evaluation of preop risk assessment\par \par this patient has an intermediate risk for major adverese cardiac events based upon [place] risk. \par risks benefits alternatives were discussed with the patient.\par all questions were answered to her satisfaction.\par

## 2023-07-13 NOTE — PHYSICAL EXAM
[General Appearance - Well Developed] : well developed [Normal Appearance] : normal appearance [Well Groomed] : well groomed [General Appearance - Well Nourished] : well nourished [No Deformities] : no deformities [General Appearance - In No Acute Distress] : no acute distress [Normal Conjunctiva] : the conjunctiva exhibited no abnormalities [Eyelids - No Xanthelasma] : the eyelids demonstrated no xanthelasmas [Normal Oral Mucosa] : normal oral mucosa [No Oral Pallor] : no oral pallor [No Oral Cyanosis] : no oral cyanosis [Normal Jugular Venous A Waves Present] : normal jugular venous A waves present [Normal Jugular Venous V Waves Present] : normal jugular venous V waves present [No Jugular Venous Chapman A Waves] : no jugular venous chapman A waves [Respiration, Rhythm And Depth] : normal respiratory rhythm and effort [Exaggerated Use Of Accessory Muscles For Inspiration] : no accessory muscle use [Auscultation Breath Sounds / Voice Sounds] : lungs were clear to auscultation bilaterally [Heart Rate And Rhythm] : heart rate and rhythm were normal [Murmurs] : no murmurs present [Heart Sounds] : normal S1 and S2 [Abdomen Soft] : soft [Abdomen Tenderness] : non-tender [Abdomen Mass (___ Cm)] : no abdominal mass palpated [Abnormal Walk] : normal gait [Gait - Sufficient For Exercise Testing] : the gait was sufficient for exercise testing [Nail Clubbing] : no clubbing of the fingernails [Cyanosis, Localized] : no localized cyanosis [Petechial Hemorrhages (___cm)] : no petechial hemorrhages [Skin Color & Pigmentation] : normal skin color and pigmentation [] : no rash [No Venous Stasis] : no venous stasis [Skin Lesions] : no skin lesions [No Skin Ulcers] : no skin ulcer [No Xanthoma] : no  xanthoma was observed [Oriented To Time, Place, And Person] : oriented to person, place, and time [Affect] : the affect was normal [Mood] : the mood was normal [No Anxiety] : not feeling anxious

## 2023-07-13 NOTE — PHYSICAL EXAM
[General Appearance - Well Developed] : well developed [Normal Appearance] : normal appearance [Well Groomed] : well groomed [General Appearance - Well Nourished] : well nourished [No Deformities] : no deformities [General Appearance - In No Acute Distress] : no acute distress [Normal Conjunctiva] : the conjunctiva exhibited no abnormalities [Eyelids - No Xanthelasma] : the eyelids demonstrated no xanthelasmas [Normal Oral Mucosa] : normal oral mucosa [No Oral Pallor] : no oral pallor [No Oral Cyanosis] : no oral cyanosis [Normal Jugular Venous A Waves Present] : normal jugular venous A waves present [Normal Jugular Venous V Waves Present] : normal jugular venous V waves present [No Jugular Venous Chapman A Waves] : no jugular venous chapman A waves [Respiration, Rhythm And Depth] : normal respiratory rhythm and effort [Exaggerated Use Of Accessory Muscles For Inspiration] : no accessory muscle use [Auscultation Breath Sounds / Voice Sounds] : lungs were clear to auscultation bilaterally [Heart Rate And Rhythm] : heart rate and rhythm were normal [Heart Sounds] : normal S1 and S2 [Murmurs] : no murmurs present [Abdomen Soft] : soft [Abdomen Tenderness] : non-tender [Abdomen Mass (___ Cm)] : no abdominal mass palpated [Abnormal Walk] : normal gait [Gait - Sufficient For Exercise Testing] : the gait was sufficient for exercise testing [Nail Clubbing] : no clubbing of the fingernails [Cyanosis, Localized] : no localized cyanosis [Petechial Hemorrhages (___cm)] : no petechial hemorrhages [Skin Color & Pigmentation] : normal skin color and pigmentation [] : no rash [No Venous Stasis] : no venous stasis [Skin Lesions] : no skin lesions [No Skin Ulcers] : no skin ulcer [No Xanthoma] : no  xanthoma was observed [Oriented To Time, Place, And Person] : oriented to person, place, and time [Affect] : the affect was normal [Mood] : the mood was normal [No Anxiety] : not feeling anxious

## 2023-07-13 NOTE — ASSESSMENT
[FreeTextEntry1] : Without evidence or recent infarction overt heart failure or unstable angina and based upon a satisfactory stress test  Ms Swain may undergo planned proctoplasty which constitutes low risk surgery in a patient with intermediate cardiac risk at an ASA class II  anesthesia risk.\par \par Medical necessity\par This is a high encounter based upon two or more chronic illnesses with mild exacerbation requiring further management and evaluation.   .\par \par EKG is indicated for evaluation of preop risk assessment\par \par this patient has an intermediate risk for major adverese cardiac events based upon [place] risk. \par risks benefits alternatives were discussed with the patient.\par all questions were answered to her satisfaction.\par

## 2024-01-10 ENCOUNTER — APPOINTMENT (OUTPATIENT)
Dept: OBGYN | Facility: CLINIC | Age: 79
End: 2024-01-10
Payer: MEDICARE

## 2024-01-10 VITALS
WEIGHT: 122 LBS | HEIGHT: 63 IN | DIASTOLIC BLOOD PRESSURE: 74 MMHG | SYSTOLIC BLOOD PRESSURE: 128 MMHG | HEART RATE: 74 BPM | BODY MASS INDEX: 21.62 KG/M2

## 2024-01-10 DIAGNOSIS — Z01.419 ENCOUNTER FOR GYNECOLOGICAL EXAMINATION (GENERAL) (ROUTINE) W/OUT ABNORMAL FINDINGS: ICD-10-CM

## 2024-01-10 DIAGNOSIS — N81.89 OTHER FEMALE GENITAL PROLAPSE: ICD-10-CM

## 2024-01-10 PROCEDURE — G0101: CPT

## 2024-01-10 PROCEDURE — 99213 OFFICE O/P EST LOW 20 MIN: CPT | Mod: 25

## 2024-01-14 PROBLEM — N81.89 PELVIC FLOOR RELAXATION: Status: ACTIVE | Noted: 2024-01-14

## 2024-01-14 LAB
CYTOLOGY CVX/VAG DOC THIN PREP: NORMAL
HPV HIGH+LOW RISK DNA PNL CVX: NOT DETECTED

## 2024-01-14 NOTE — PHYSICAL EXAM
[Awake] : awake [Alert] : alert [Soft] : soft [Oriented x3] : oriented to person, place, and time [Normal] : cervix [No Bleeding] : there was no active vaginal bleeding [Enlarged ___ wks] : enlarged [unfilled] ~Uweeks [Uterine Adnexae] : were not tender and not enlarged [Chaperone Present] : A chaperone was present in the examining room during all aspects of the physical examination [Acute Distress] : no acute distress [Mass] : no breast mass [Nipple Discharge] : no nipple discharge [Axillary LAD] : no axillary lymphadenopathy [Tender] : non tender [FreeTextEntry4] : enterocele

## 2024-01-14 NOTE — HISTORY OF PRESENT ILLNESS
[Good] : being in good health [Postmenopausal] : is postmenopausal [HPV Vaccine NA Due to Age] : HPV vaccine not available to patient due to age [FreeTextEntry1] : rectocele repair pt with myoma and noted to have enterocele [BoneDensityDate] : emr [ColonoscopyDate] : emr [Mammogramdate] : see emr [PapSmeardate] : 2019

## 2024-01-22 ENCOUNTER — NON-APPOINTMENT (OUTPATIENT)
Age: 79
End: 2024-01-22

## 2024-01-24 ENCOUNTER — APPOINTMENT (OUTPATIENT)
Dept: CARDIOLOGY | Facility: CLINIC | Age: 79
End: 2024-01-24
Payer: MEDICARE

## 2024-01-24 ENCOUNTER — NON-APPOINTMENT (OUTPATIENT)
Age: 79
End: 2024-01-24

## 2024-01-24 VITALS
SYSTOLIC BLOOD PRESSURE: 149 MMHG | HEIGHT: 63 IN | BODY MASS INDEX: 21.62 KG/M2 | TEMPERATURE: 92.4 F | RESPIRATION RATE: 19 BRPM | HEART RATE: 75 BPM | OXYGEN SATURATION: 100 % | WEIGHT: 122 LBS | DIASTOLIC BLOOD PRESSURE: 76 MMHG

## 2024-01-24 DIAGNOSIS — E78.5 HYPERLIPIDEMIA, UNSPECIFIED: ICD-10-CM

## 2024-01-24 DIAGNOSIS — R00.2 PALPITATIONS: ICD-10-CM

## 2024-01-24 DIAGNOSIS — I10 ESSENTIAL (PRIMARY) HYPERTENSION: ICD-10-CM

## 2024-01-24 PROCEDURE — 93000 ELECTROCARDIOGRAM COMPLETE: CPT

## 2024-01-24 PROCEDURE — 93225 XTRNL ECG REC<48 HRS REC: CPT

## 2024-01-24 PROCEDURE — 99215 OFFICE O/P EST HI 40 MIN: CPT

## 2024-01-24 NOTE — HISTORY OF PRESENT ILLNESS
[FreeTextEntry1] : She was last seen by me in the office in 2019.  She was seen last year by Dr. Mejia in the office for preoperative cardiac clearance. She presents for follow-up visit as per primary care provider. She reports an active lifestyle.  She is always on her feet moving about, doing housework or going outside of the house, shopping or volunteering. She notes occasional feelings of palpitations.  If she is lying in bed, on her left side, she is aware of her heart beating and sometimes it beats fast.  Last for a few seconds to a few minutes at a time and resolves on its own.  Palpitations are not associated with effort or exertion. No complaints of chest pain or chest pressure.  No shortness of breath or dyspnea on exertion.  No dizziness or lightheadedness.  No syncope.  No edema, orthopnea.  No PND. Reports compliance to current medications.

## 2024-01-24 NOTE — PHYSICAL EXAM
[General Appearance - Well Developed] : well developed [Normal Appearance] : normal appearance [Well Groomed] : well groomed [No Deformities] : no deformities [General Appearance - Well Nourished] : well nourished [General Appearance - In No Acute Distress] : no acute distress [Normal Conjunctiva] : the conjunctiva exhibited no abnormalities [No Oral Pallor] : no oral pallor [Normal Oropharynx] : normal oropharynx [Normal Jugular Venous V Waves Present] : normal jugular venous V waves present [Normal] : normal [No Precordial Heave] : no precordial heave was noted [Normal Rate] : normal [Rhythm Regular] : regular [Normal S1] : normal S1 [Normal S2] : normal S2 [No Gallop] : no gallop heard [No Murmur] : no murmurs heard [2+] : left 2+ [No Pitting Edema] : no pitting edema present [Respiration, Rhythm And Depth] : normal respiratory rhythm and effort [Auscultation Breath Sounds / Voice Sounds] : lungs were clear to auscultation bilaterally [Abdomen Tenderness] : non-tender [Abdomen Soft] : soft [Abnormal Walk] : normal gait [Nail Clubbing] : no clubbing of the fingernails [Cyanosis, Localized] : no localized cyanosis [Skin Color & Pigmentation] : normal skin color and pigmentation [] : no rash [Oriented To Time, Place, And Person] : oriented to person, place, and time [No Anxiety] : not feeling anxious [S3] : no S3 [S4] : no S4 [Click] : no click [Pericardial Rub] : no pericardial rub [Right Carotid Bruit] : no bruit heard over the right carotid [Left Carotid Bruit] : no bruit heard over the left carotid

## 2024-01-24 NOTE — REASON FOR VISIT
[FreeTextEntry1] : Cardiology follow-up visit for evaluation management of hypertension, hyperlipidemia, palpitations.

## 2024-01-24 NOTE — DISCUSSION/SUMMARY
[FreeTextEntry1] : 78-year-old woman referred for cardiology reevaluation due to concern for hypertension, hyperlipidemia, palpitations. She has a moderately active lifestyle. Blood pressure stable.  There is no JVD.  Lung fields are clear.  No edema.  She is euvolemic. EKG is sinus rhythm.  Low voltage.  No significant ST changes. Exercise stress test was done within the past year, and this was largely unremarkable. Rule out arrhythmia.  Rule out structural heart disease.  Plan 1.  24-hour Holter monitor is placed today for evaluation of palpitations, rule out arrhythmia. 2.  Echocardiogram to be scheduled to evaluate for structural heart disease. 3.  Current medications are reviewed, no changes. 4.  Further recommendations pending above workup. 5.  The above was reviewed with the patient, all questions have been answered to her satisfaction.

## 2024-01-26 PROCEDURE — 93227 XTRNL ECG REC<48 HR R&I: CPT

## 2024-01-30 ENCOUNTER — APPOINTMENT (OUTPATIENT)
Dept: OBGYN | Facility: CLINIC | Age: 79
End: 2024-01-30
Payer: MEDICARE

## 2024-01-30 DIAGNOSIS — R19.00 INTRA-ABDOMINAL AND PELVIC SWELLING, MASS AND LUMP, UNSPECIFIED SITE: ICD-10-CM

## 2024-01-30 DIAGNOSIS — D21.9 BENIGN NEOPLASM OF CONNECTIVE AND OTHER SOFT TISSUE, UNSPECIFIED: ICD-10-CM

## 2024-01-30 PROCEDURE — 99213 OFFICE O/P EST LOW 20 MIN: CPT | Mod: 95

## 2024-01-30 NOTE — REASON FOR VISIT
[Home] : at home, [unfilled] , at the time of the visit. [Medical Office: ([facility])___] : at the medical office located in  [Patient] : the patient [Follow-Up] : a follow-up evaluation of

## 2024-01-30 NOTE — HISTORY OF PRESENT ILLNESS
[FreeTextEntry1] : Myomata--rev prev iamging as well as recent--enalkrging--rec pelvic MRI to assess

## 2024-02-07 ENCOUNTER — APPOINTMENT (OUTPATIENT)
Dept: CARDIOLOGY | Facility: CLINIC | Age: 79
End: 2024-02-07
Payer: MEDICARE

## 2024-02-07 PROCEDURE — 93306 TTE W/DOPPLER COMPLETE: CPT

## 2024-02-08 ENCOUNTER — NON-APPOINTMENT (OUTPATIENT)
Age: 79
End: 2024-02-08

## 2024-02-20 ENCOUNTER — NON-APPOINTMENT (OUTPATIENT)
Age: 79
End: 2024-02-20

## 2024-03-06 ENCOUNTER — APPOINTMENT (OUTPATIENT)
Dept: ORTHOPEDIC SURGERY | Facility: CLINIC | Age: 79
End: 2024-03-06
Payer: MEDICARE

## 2024-03-06 VITALS
HEIGHT: 62 IN | WEIGHT: 125 LBS | HEART RATE: 67 BPM | BODY MASS INDEX: 23 KG/M2 | SYSTOLIC BLOOD PRESSURE: 157 MMHG | DIASTOLIC BLOOD PRESSURE: 65 MMHG

## 2024-03-06 DIAGNOSIS — M67.911 UNSPECIFIED DISORDER OF SYNOVIUM AND TENDON, RIGHT SHOULDER: ICD-10-CM

## 2024-03-06 PROCEDURE — 99214 OFFICE O/P EST MOD 30 MIN: CPT

## 2024-03-06 PROCEDURE — 73030 X-RAY EXAM OF SHOULDER: CPT | Mod: RT

## 2024-04-12 ENCOUNTER — NON-APPOINTMENT (OUTPATIENT)
Age: 79
End: 2024-04-12

## 2024-05-21 ENCOUNTER — NON-APPOINTMENT (OUTPATIENT)
Age: 79
End: 2024-05-21

## 2024-08-07 PROBLEM — C82.90 FOLLICULAR LYMPHOMA: Status: ACTIVE | Noted: 2024-08-07

## 2024-08-13 ENCOUNTER — NON-APPOINTMENT (OUTPATIENT)
Age: 79
End: 2024-08-13

## 2024-08-16 DIAGNOSIS — N63.0 UNSPECIFIED LUMP IN UNSPECIFIED BREAST: ICD-10-CM

## 2025-08-15 ENCOUNTER — NON-APPOINTMENT (OUTPATIENT)
Age: 80
End: 2025-08-15